# Patient Record
Sex: MALE | URBAN - METROPOLITAN AREA
[De-identification: names, ages, dates, MRNs, and addresses within clinical notes are randomized per-mention and may not be internally consistent; named-entity substitution may affect disease eponyms.]

---

## 2024-11-15 ENCOUNTER — OFFICE VISIT (OUTPATIENT)
Dept: URBAN - METROPOLITAN AREA CLINIC 109 | Facility: CLINIC | Age: 69
End: 2024-11-15

## 2024-12-20 ENCOUNTER — OFFICE VISIT (OUTPATIENT)
Dept: URBAN - METROPOLITAN AREA CLINIC 109 | Facility: CLINIC | Age: 69
End: 2024-12-20

## 2025-01-17 ENCOUNTER — OFFICE VISIT (OUTPATIENT)
Dept: URBAN - METROPOLITAN AREA CLINIC 109 | Facility: CLINIC | Age: 70
End: 2025-01-17
Payer: COMMERCIAL

## 2025-01-17 ENCOUNTER — DASHBOARD ENCOUNTERS (OUTPATIENT)
Age: 70
End: 2025-01-17

## 2025-01-17 VITALS
WEIGHT: 280.6 LBS | TEMPERATURE: 97.5 F | DIASTOLIC BLOOD PRESSURE: 75 MMHG | HEART RATE: 74 BPM | SYSTOLIC BLOOD PRESSURE: 135 MMHG | HEIGHT: 76 IN | BODY MASS INDEX: 34.17 KG/M2

## 2025-01-17 DIAGNOSIS — Z12.11 ENCOUNTER FOR SCREENING FOR MALIGNANT NEOPLASM OF COLON: ICD-10-CM

## 2025-01-17 DIAGNOSIS — D50.0 IRON DEFICIENCY ANEMIA DUE TO CHRONIC BLOOD LOSS: ICD-10-CM

## 2025-01-17 DIAGNOSIS — R93.2 ABNORMAL LIVER CT: ICD-10-CM

## 2025-01-17 PROBLEM — 724556004: Status: ACTIVE | Noted: 2025-01-17

## 2025-01-17 PROCEDURE — 99204 OFFICE O/P NEW MOD 45 MIN: CPT | Performed by: INTERNAL MEDICINE

## 2025-01-17 RX ORDER — METFORMIN HYDROCHLORIDE 750 MG/1
TAKE 1 TABLET BY MOUTH TWICE DAILY FOR DIABETES TABLET, EXTENDED RELEASE ORAL
Qty: 200 EACH | Refills: 1 | Status: ACTIVE | COMMUNITY

## 2025-01-17 RX ORDER — AMLODIPINE BESYLATE 10 MG/1
TAKE 1 TABLET BY MOUTH ONCE DAILY AS DIRECTED FOR HIGH BLOOD PRESSURE TABLET ORAL
Qty: 90 EACH | Refills: 0 | Status: ACTIVE | COMMUNITY

## 2025-01-17 RX ORDER — CARVEDILOL 25 MG/1
TAKE 1 TABLET BY MOUTH TWICE DAILY AS DIRECTED FOR HEART AND FOR BLOOD PRESSURE TABLET, FILM COATED ORAL
Qty: 180 EACH | Refills: 0 | Status: ACTIVE | COMMUNITY

## 2025-01-17 RX ORDER — POLYETHYLENE GLYCOL-3350 AND ELECTROLYTES WITH FLAVOR PACK 240; 5.84; 2.98; 6.72; 22.72 G/278.26G; G/278.26G; G/278.26G; G/278.26G; G/278.26G
4000ML BY MOUTH TWO DAYS BEFORE THE PROCEDURE AND ANOTHER 4000ML BY MOUTH THE DAY BEFORE THE PROCEDURE, AS DIRECTED POWDER, FOR SOLUTION ORAL DAILY
Qty: 8000 ML | Refills: 0 | OUTPATIENT
Start: 2025-01-17 | End: 2025-01-19

## 2025-01-17 RX ORDER — APIXABAN 5 MG/1
TAKE 1 TABLET BY MOUTH TWICE DAILY AS DIRECTED BLOOD THINNER TABLET, FILM COATED ORAL
Qty: 60 EACH | Refills: 0 | Status: ACTIVE | COMMUNITY

## 2025-01-17 RX ORDER — SILDENAFIL 100 MG/1
TAKE 1 TABLET BY MOUTH ONCE DAILY AS NEEDED. TAKE 30 TO 60 MINUTES BEFORE SEXUAL ACTIVITY TABLET, FILM COATED ORAL
Qty: 30 EACH | Refills: 4 | Status: ACTIVE | COMMUNITY

## 2025-01-17 NOTE — HPI-TODAY'S VISIT:
had abd pain cleansed his colon doing better but found to have cyst in liver and kidney so sent here ([facility])   also referred for JOHN, hgb 10

## 2025-02-11 ENCOUNTER — TELEPHONE ENCOUNTER (OUTPATIENT)
Dept: URBAN - METROPOLITAN AREA CLINIC 23 | Facility: CLINIC | Age: 70
End: 2025-02-11

## 2025-08-04 ENCOUNTER — LAB OUTSIDE AN ENCOUNTER (OUTPATIENT)
Dept: URBAN - METROPOLITAN AREA CLINIC 109 | Facility: CLINIC | Age: 70
End: 2025-08-04